# Patient Record
Sex: MALE | Race: WHITE
[De-identification: names, ages, dates, MRNs, and addresses within clinical notes are randomized per-mention and may not be internally consistent; named-entity substitution may affect disease eponyms.]

---

## 2019-07-12 NOTE — DIAGNOSTIC IMAGING REPORT
EXAMINATION:  CHEST 2 VIEWS    



INDICATION: Pre-operative. 



COMPARISON:  None

     

FINDINGS:

TUBES and LINES:  None.



LUNGS:  Lungs are well inflated.  Lungs are clear.   There is no evidence of

pneumonia or pulmonary edema.



PLEURA:  No pleural effusion or pneumothorax. 



HEART AND MEDIASTINUM:  The cardiomediastinal silhouette is unremarkable.    



BONES AND SOFT TISSUES:  No acute osseous lesion.  Soft tissues are

unremarkable.



UPPER ABDOMEN: No free air under the diaphragm.    



IMPRESSION: 

No acute radiographic abnormality.



Signed by: Dr. Lizbet Quintanilla MD on 7/12/2019 12:38 PM

## 2019-07-15 NOTE — PRE OP HISTORY & PHYSICAL
DATE OF SURGERY:  07/16/2019.

 

CHIEF COMPLAINT:  Left neck mass.

 

HISTORY OF PRESENT ILLNESS:  This 58 years old male was noted to have 1 year history of

left neck mass.  The patient has been treated with antibiotics with no improvement.  The

patient had a MRI of the C-spine, which also confirmed a neck mass.  He denies any

dysphagia, odynophagia, or shortness of breath.  The patient smokes a pack and a half a

day and drinks a few beers per day.  CT scan of the neck ordered by me showed the

patient has multiple variable sized nodules and lymph nodes in the left posterior

triangle, lymphoma and inflammatory disease and metastatic disease cannot be ruled out. 

 

REVIEW OF SYSTEMS:

System review showed no recent cardiovascular, respiratory, GI problem.

 

PAST MEDICAL HISTORY:  The patient has a history of hepatitis C.

 

PAST SURGICAL HISTORY:  He has no previous surgery.

 

ALLERGIES:  HE IS ALLERGIC TO PENICILLIN, AMPICILLIN, AND TETRACYCLINE.

 

MEDICATIONS:  He is on hydrocodone, Flexeril, and ibuprofen.

 

SOCIAL HISTORY:  He smokes about a pack and a half a day and drinks about three beers

per day. 

 

FAMILY HISTORY:  Noncontributory.

 

PHYSICAL EXAMINATION:

VITAL SIGNS:  On examination, the patient's vital signs were within normal limits.  He

was seen with his wife. 

HEENT:  Ear exam showed normal tympanic membranes bilaterally.  Nasal exam showed

deviated nasal septum on the right side about 20%.  Nasal endoscopy showed mobile vocal

folds bilaterally with no lesion in the hypopharynx.  Oropharynx and oral cavity showed

dentures upper and lower.  No other abnormality was noted. 

NECK:  Show left neck mass from the jugulodigastric area all the way to the posterior

triangle.  No thyroid is palpable. 

CHEST:  Showed good air entry bilaterally. 

CARDIOVASCULAR:  Showed S1, S2.  No murmur noted.

ASSESSMENT AND PLAN:  Mr. Isbell has left neck masses.  This has been going on for about

a year.  The suggested treatment is panendoscopy, biopsy, and excisional biopsy of left

neck mass, possibly in the jugulodigastric area but also possibly in the posterior

triangle and other necessary procedure.  Complication of procedure includes, but not

limited to bleeding, infection, facial nerve injury, accessory nerve injury, perforation

of the esophagus, pneumomediastinum, mediastinitis, airway compromise, persistent

recurrence of problem along with great auricular nerve injury and hypoglossal nerve

injury along with poor cosmetic results.  Alternative will be continue observation,

fine-needle aspiration of the lymph node.  The patient and his wife have elected to

undergo surgical procedure.  He has been advised to stop his 

ibuprofen at least 5 to 7 days before surgery.  The patient had a chest x-ray before

surgery, which did not show any abnormality. 

 

 

 

 

______________________________

MD SENTHIL Ocampo/JENA

D:  07/15/2019 11:39:33

T:  07/15/2019 12:19:18

Job #:  [id number]

 

cc:            Rosa Elena Haley

## 2019-07-16 ENCOUNTER — HOSPITAL ENCOUNTER (OUTPATIENT)
Dept: HOSPITAL 88 - OR | Age: 59
Discharge: HOME | End: 2019-07-16
Attending: OTOLARYNGOLOGY
Payer: COMMERCIAL

## 2019-07-16 VITALS — DIASTOLIC BLOOD PRESSURE: 81 MMHG | SYSTOLIC BLOOD PRESSURE: 139 MMHG

## 2019-07-16 DIAGNOSIS — C96.9: Primary | ICD-10-CM

## 2019-07-16 DIAGNOSIS — Z86.19: ICD-10-CM

## 2019-07-16 DIAGNOSIS — Z88.1: ICD-10-CM

## 2019-07-16 DIAGNOSIS — Z88.0: ICD-10-CM

## 2019-07-16 DIAGNOSIS — D10.4: ICD-10-CM

## 2019-07-16 DIAGNOSIS — Z01.810: ICD-10-CM

## 2019-07-16 DIAGNOSIS — Z01.818: ICD-10-CM

## 2019-07-16 LAB
DEPRECATED APTT PLAS QN: 32.9 SECONDS (ref 23.8–35.5)
DEPRECATED INR PLAS: 0.97
PROTHROMBIN TIME: 13.4 SECONDS (ref 11.9–14.5)

## 2019-07-16 PROCEDURE — 38510 BIOPSY/REMOVAL LYMPH NODES: CPT

## 2019-07-16 PROCEDURE — 93005 ELECTROCARDIOGRAM TRACING: CPT

## 2019-07-16 PROCEDURE — 36415 COLL VENOUS BLD VENIPUNCTURE: CPT

## 2019-07-16 PROCEDURE — 31535 LARYNGOSCOPY W/BIOPSY: CPT

## 2019-07-16 PROCEDURE — 85610 PROTHROMBIN TIME: CPT

## 2019-07-16 PROCEDURE — 31622 DX BRONCHOSCOPE/WASH: CPT

## 2019-07-16 PROCEDURE — 88342 IMHCHEM/IMCYTCHM 1ST ANTB: CPT

## 2019-07-16 PROCEDURE — 88304 TISSUE EXAM BY PATHOLOGIST: CPT

## 2019-07-16 PROCEDURE — 43191 ESOPHAGOSCOPY RIGID TRNSO DX: CPT

## 2019-07-16 PROCEDURE — 88305 TISSUE EXAM BY PATHOLOGIST: CPT

## 2019-07-16 PROCEDURE — 71046 X-RAY EXAM CHEST 2 VIEWS: CPT

## 2019-07-16 PROCEDURE — 85730 THROMBOPLASTIN TIME PARTIAL: CPT

## 2019-07-16 NOTE — OPERATIVE REPORT
DATE OF PROCEDURE:  07/16/2019

 

SURGEON:  Abdirahman Asher MD

 

CHIEF COMPLAINT:  Left posterior triangle mass.

 

POSTOPERATIVE DIAGNOSIS:  Left posterior triangle mass.

 

OPERATIVE PROCEDURES:  Direct laryngoscopy, rigid esophagoscopy, rigid bronchoscopy,

biopsy of left tongue base, biopsy of the right tonsil area midportion, excision of

biopsy of left neck mass with appropriate closure. 

 

FIRST ASSISTANT:  Tania Zhu.

 

ANESTHESIA:  [facility].

 

HISTORY OF PRESENT ILLNESS:  This is a 58-year-old male, who has one-year history of

mass in the left neck.  The patient denies any dysphagia, odynophagia, or shortness of

breath.  The patient is a smoker.  On examination, he was noted to have fullness in the

left neck in the midportion to the inferior portion of the posterior triangle.  A CT

scan of the neck that was done showed the patient has cluster of lymphadenopathy from

the level of the parotid and along the posterior triangle on the way to the inferior

portion of the neck.  No other abnormality was noted.  It was decided that excisional

biopsy of the left neck mass along with panendoscopy and biopsy and other necessary

procedure will be beneficial for him. 

 

DESCRIPTION OF PROCEDURE:  The patient was taken to the operating room, put under

general anesthesia, endotracheally intubated.  The neck excision was undertaken first.

An incision was made in about the midportion of the neck in a horizontal fashion.  The

area was injected with 1% Xylocaine with 1:100,000 epinephrine for hemostasis.  The area

was prepped and draped in sterile fashion.  The dissection was carried down to the

subplatysmal plane.  Superior and inferior flap was elevated.  The anterior border of

the SCM was identified and this was  from surrounding soft tissue.  The

approach was to go by rotating the SCM laterally to get exposure to the posterior

triangle.  The internal jugular vein come into view, this was not disturbed.  A branch

of the IJ was noted.  This was examined high after getting exposure.  With some

difficulty, the SCM was rotated and posterior to the IJ lymphadenopathy come into view.

These were dissected from the surrounding soft tissue using bipolar cautery.  The lymph

node was delivered and sent for permanent section. 

 

Closure of the area was undertaken.  The area was irrigated with copious amount of

normal saline.  Any bleeding area was controlled using the bipolar cautery.  A one

8-inch Penrose drain was inserted into the underneath the SCM.  The wound was irrigated

with copious amount of normal saline.  The platysmal flap that was elevated was advanced

in the midline and closed on itself using 0 Vicryl suture in interrupted fashion.  The

skin incision was closed using 4-0 Prolene suture in an interrupted fashion.  The

Penrose drain was sutured in place.  The pressure dressing was applied. 

 

The panendoscopy was performed.  The patient was repositioned.  The rigid esophagoscopy

was performed.  Esophagoscope was passed through the cricopharyngeus muscle.  The

esophagus was examined to about 25 cm from the incisors, no abnormality was noted.  The

esophagoscope was retrieved. 

 

The rigid bronchoscopy was performed.  A size #4 bronchoscope with Soto wire was

used.  The bronchoscope was passed parallel to the endotracheal tube.  Endotracheal tube

cuff was deflated.  The trachea was examined down to the tessa, parallel to the

endotracheal tube.  No abnormality was noted.  The bronchoscope was retrieved.

Endotracheal tube cuff was reinflated. 

 

The direct laryngoscopy was performed.  The Anali laryngoscope was used.  The

oropharynx and oral cavity were examined.  Increased lymphoid tissue was noted in the

left tongue base.  This was biopsied using a cup forceps.  The papillary lesion was

noted in the midportion of the right tonsil.  This was biopsied again using a cup

forceps.  The piriform sinus on either side was examined, no abnormality was noted.  The

larynx was examined.  Both the true and false vocal folds were examined, no abnormality

was noted.  The patient tolerated the above procedure well with estimated blood loss of

about 5 to 10 mL.  He was given 20 mg of Decadron intraoperatively.  The patient was

able to be transferred to the recovery room in stable condition. 

 

 

 

 

______________________________

MD SENTHIL Ocampo/MODL

D:  07/16/2019 13:43:42

T:  07/16/2019 14:18:09

Job #:  [id number]

## 2019-07-16 NOTE — XMS REPORT
Patient Summary Document

                             Created on: 2019



FELICIANO BOWEN

External Reference #: [id number]

: 1960

Sex: Male



Demographics







                          Address                   [address] 

[place]  [address]

 

                          Home Phone                [phone]

 

                          Preferred Language        Unknown

 

                          Marital Status            Unknown

 

                          Yazidi Affiliation     Unknown

 

                          Race                      Unknown

 

                                        Additional Race(s)  

 

                          Ethnic Group              Unknown





Author







                          Author                    [facility]

 

                          Address                   Unknown

 

                          Phone                     Unavailable







Care Team Providers







                    Care Team Member Name    Role                Phone

 

                    VIV TA    Unavailable         Unavailable







Problems

This patient has no known problems.



Allergies, Adverse Reactions, Alerts

This patient has no known allergies or adverse reactions.



Medications

This patient has no known medications.



Results







           Test Description    Test Time    Test Comments    Text Results    Atomic Results    Result

 Comments

 

                CHEST 2 VIEWS    2019 12:37:00                                                             

                                             [place] [address]  
   Patient Name: FELICIANO BOWEN JR                                   MR #: 
[id number]                     : 1960                                  
Age/Sex: 58/M  Acct #: [id number]                              Req #: [id number]
[id number]  Adm Physician:                                                      
Ordered by: PUJA TA MD                            Report #: [id number]  
     Location: OR                                      Room/Bed:                
    
___________________________________________________________________________________________________
   Procedure: [id number] DX/CHEST 2 VIEWS  Exam Date:                            
Exam Time:                                               REPORT STATUS: Signed  
 EXAMINATION:  CHEST 2 VIEWS          INDICATION: Pre-operative.       
COMPARISON:  None           FINDINGS:   TUBES and LINES:  None.      LUNGS:  
Lungs are well inflated.  Lungs are clear.   There is no evidence of   pneumonia
or pulmonary edema.      PLEURA:  No pleural effusion or pneumothorax.       
HEART AND MEDIASTINUM:  The cardiomediastinal silhouette is unremarkable.       
  BONES AND SOFT TISSUES:  No acute osseous lesion.  Soft tissues are   
unremarkable.      UPPER ABDOMEN: No free air under the diaphragm.          
IMPRESSION:    No acute radiographic abnormality.      Signed by: Dr. Benny Corado MD on 2019 12:38 PM        Dictated By: BENNY CORADO MD  Electronically 
Signed By: BENNY CORADO MD on 19 1238  Transcribed By: LUCILLE on 19 
1238       COPY TO:   PUJA TA MD

## 2019-07-16 NOTE — XMS REPORT
Clinical Summary

                             Created on: 2019



Kedar Isbell

External Reference #: [id number]

: 1960

Sex: Male



Demographics







                          Address                   [place]  [address]

 

                          Home Phone                [phone]

 

                          Preferred Language        English

 

                          Marital Status            

 

                          Pentecostalism Affiliation     Unknown

 

                          Race                      White

 

                          Ethnic Group              Non-





Author







                          Author                    [facility]

 

                          Organization              [facility]

 

                          Address                   Unknown

 

                          Phone                     Unavailable







Support







                Name            Relationship    Address         Phone

 

                Starla Isbell            Unknown         [phone]







Care Team Providers







                    Care Team Member Name    Role                Phone

 

                    Asked, No Pcp       PCP                 Unavailable







Allergies







                                        Comments



                 Active Allergy     Reactions       Severity        Noted Date 

 

                                         



                           Ampicillin                2018 

 

                                         



                           Penicillin                2018 

 

                                         



                           Tetracycline              2018 







Medications







                          End Date                  Status



              Medication     Sig          Dispensed     Refills      Start  



                                         Date  

 

                                                    Active



                 cyclobenzaprine     Take 5 mg by      1                 



                     (FLEXERIL) 5 mg tablet     mouth 2 (two)       8  



                                         times a day.     

 

                                                    Active



                     HYDROcodone-acetaminophen       0                     



                           (NORCO)  mg per        8  



                                         tablet      

 

                                                    Active



                     gabapentin (NEURONTIN)     Take 100 mg         0   



                           100 mg capsule            by mouth 2     



                                         (two) times a     



                                         day.     

 

                                                    Active



                     sofosbuvir-velpatasvir     Take 1 tablet       0   



                           (EPCLUSA) 400-100 mg      by mouth     



                           tablet                    daily.     

 

                                                    Active



                     ribavirin (REBETOL) 200     Take 200 mg         0   



                           MG capsule                by mouth     



                                         daily before     



                                         breakfast.     

 

                                                    Active



                     ribavirin (REBETOL) 200     Take 400 mg         0   



                           MG capsule                by mouth     



                                         every     



                                         evening.     

 

                                                    Active



              diclofenac (VOLTAREN) 1 %     Apply        100 g        1              



                     gel                 topically 4         8  



                                         (four) times     



                                         a day.     







Active Problems







 



                           Problem                   Noted Date

 

 



                           Neck pain, musculoskeletal     2018

 

 



                           Hepatic fibrosis          2018

 

 



                           Abnormal LFTs             2018

 

 



                           Rash of hands             2018

 

 



                           Hepatitis C virus infection without hepatic coma     2018

 

 



                           Itching                   2018







Encounters







                          Care Team                 Description



                     Date                Type                Specialty  

 

                                        



Ester Ontiveros MA                        



                     2019          Telephone           Hepatology  

 

                                        



Ester Ontiveros MA                       Hepatitis C virus infection without hepatic coma, unspecified 

chronicity (Primary Dx)



                     2019          Orders Only         Hepatology  

 

                                        



Bernabe Harris MD Roitsch, Ashley Aryn, [facility]              Chronic hepatitis C without hepatic coma (HCC) (Primary

 Dx); 

Hepatic fibrosis; 

Abnormal LFTs



                     10/25/2018          Office Visit        Hepatology  

 

                                        



Bernabe Harris MD                     



                     10/18/2018          Telephone           Hepatology  

 

                                        



Venkatesh Appiah MD                    Acute hepatitis C virus infection without hepatic coma; 

Hepatic fibrosis; 

Abnormal LFTs; 

Rash of hands



                     10/17/2018          Hospital            Radiology  



                                         Encounter   

 

                                        



Venkatesh Appiah MD                    Acute hepatitis C virus infection without hepatic coma; 

Hepatic fibrosis; 

Abnormal LFTs; 

Rash of hands



                     10/17/2018          Hospital            Radiology  



                                         Encounter   

 

                                        



Ester Ontiveros MA                       Chronic hepatitis C without hepatic coma (HCC) (Primary Dx)



                     10/05/2018          Orders Only         Hepatology  

 

                                        



Jessica Sheldon MA                        



                     10/02/2018          Refill              Hepatology  

 

                                        



Bernabe Harris MD Calaris, Deborah, PAC                   Acute hepatitis C virus infection without hepatic coma (Primary

 Dx); 

Hepatic fibrosis; 

Abnormal LFTs; 

Rash of hands



                     2018          Office Visit        Hepatology  

 

                                        



Bernabe Harris MD Calaris, Deborah, PAC                   Hepatitis C virus infection without hepatic coma, unspecified

 chronicity (Primary Dx); 

Rash of hands; 

Itching; 

Abnormal LFTs; 

Neck pain, musculoskeletal; 

Hepatic fibrosis



                     2018          Office Visit        Hepatology  

 

                                        



Ester Ontiveros MA                       Hepatitis C virus infection without hepatic coma, unspecified 

chronicity (Primary Dx)



                     2018          Orders Only         Hepatology  



after 07/15/2018



Family History







   



                 Medical History     Relation        Name            Comments

 

   



                           Heart attack              Father  

 

   



                           Diabetes                  Mother  

 

   



                           Heart attack              Mother  

 

   



                           Diabetes                  Sister  

 

   



                           Heart attack              Sister  









   



                 Relation        Name            Status          Comments

 

   



                                         Father   

 

   



                                         Mother   

 

   



                                         Sister   







Social History







                                        Date



                 Tobacco Use     Types           Packs/Day       Years Used 

 

                                         



                           Current Every Day Smoker      2  

 

    



                                         Smokeless Tobacco: Never   



                                         Used   









   



                 Alcohol Use     Drinks/Week     oz/Week         Comments

 

   



                     Yes                 14 Standard         8.4 



                                         drinks or  



                                         equivalent  









 



                           Sex Assigned at Birth     Date Recorded

 

 



                                         Not on file 









                                        Industry



                           Job Start Date            Occupation 

 

                                        Not on file



                           Not on file               Not on file 









                                        Travel End



                           Travel History            Travel Start 

 





                                         No recent travel history available.







Last Filed Vital Signs







                                        Time Taken



                           Vital Sign                Reading 

 

                                        10/25/2018 11:19 AM CDT



                           Blood Pressure            136/80 

 

                                        10/25/2018 11:19 AM CDT



                           Pulse                     80 

 

                                        10/25/2018 11:19 AM CDT



                           Temperature               36.1 C (97 F) 

 

                                        2018  2:18 PM CDT



                           Respiratory Rate          18 

 

                                        10/25/2018 11:19 AM CDT



                           Oxygen Saturation         99% 

 

                                        -



                           Inhaled Oxygen            - 



                                         Concentration  

 

                                        10/25/2018 11:19 AM CDT



                           Weight                    81.2 kg (179 lb) 

 

                                        2018 10:56 AM CDT



                           Height                    175.3 cm (5' 9") 

 

                                        2018 10:56 AM CDT



                           Body Mass Index           26.43 







Plan of Treatment







   



                 Health Maintenance     Due Date        Last Done       Comments

 

   



                           COLONOSCOPY SCREENING     2011  

 

   



                           SHINGLES VACCINES (#1)     2011  

 

   



                           INFLUENZA VACCINE         2019  







Procedures







                                        Comments



                 Procedure Name     Priority        Date/Time       Associated Diagnosis 

 

                                        



Results for this procedure are in the results section.



                 PROTHROMBIN TIME WITH INR     Routine         01/15/2019      Chronic hepatitis C 



                           2:13 PM CST               without hepatic coma 



                                         (HCC) 

 

                                        



Results for this procedure are in the results section.



                 HCV QUANTITATIVE PCR     Routine         01/15/2019      Chronic hepatitis C 



                           2:13 PM CST               without hepatic coma 



                                         (HCC) 

 

                                        



Results for this procedure are in the results section.



                 GGT             Routine         01/15/2019      Chronic hepatitis C 



                           2:13 PM CST               without hepatic coma 



                                         (HCC) 

 

                                        



Results for this procedure are in the results section.



                 COMPREHENSIVE METABOLIC     Routine         01/15/2019      Chronic hepatitis C 



                     PANEL               2:13 PM CST         without hepatic coma 



                                         (HCC) 

 

                                        



Results for this procedure are in the results section.



                 CBC WITH PLATELET AND     Routine         01/15/2019      Chronic hepatitis C 



                     DIFFERENTIAL        2:13 PM CST         without hepatic coma 



                                         (HCC) 

 

                                        



Results for this procedure are in the results section.



                 ALPHA FETOPROTEIN     Routine         01/15/2019      Chronic hepatitis C 



                           2:13 PM CST               without hepatic coma 



                                         (HCC) 

 

                                        



Results for this procedure are in the results section.



                 PROTHROMBIN TIME WITH INR     Routine         10/17/2018      Chronic hepatitis C 



                           12:35 PM CDT              without hepatic coma 



                                         (HCC) 

 

                                        



Results for this procedure are in the results section.



                 HCV QUANTITATIVE PCR     Routine         10/17/2018      Chronic hepatitis C 



                           12:35 PM CDT              without hepatic coma 



                                         (HCC) 

 

                                        



Results for this procedure are in the results section.



                 ALPHA FETOPROTEIN     Routine         10/17/2018      Chronic hepatitis C 



                           12:35 PM CDT              without hepatic coma 



                                         (HCC) 

 

                                        



Results for this procedure are in the results section.



                 COMPREHENSIVE METABOLIC     Routine         10/17/2018      Chronic hepatitis C 



                     PANEL               12:35 PM CDT        without hepatic coma 



                                         (HCC) 

 

                                        



Results for this procedure are in the results section.



                 CBC WITH PLATELET AND     Routine         10/17/2018      Chronic hepatitis C 



                     DIFFERENTIAL        12:35 PM CDT        without hepatic coma 



                                         (HCC) 

 

                                        



Results for this procedure are in the results section.



                 US ABDOMINAL DOPPLER     Routine         10/17/2018      Acute hepatitis C virus 



                           11:24 AM CDT              infection without hepatic 



                                         coma 



                                         Hepatic fibrosis 



                                         Abnormal LFTs 



                                         Rash of hands 

 

                                        



Results for this procedure are in the results section.



                 US ABDOMINAL WITH LIVER     Routine         10/17/2018      Acute hepatitis C virus 



                     ELASTOGRAPHY        10:29 AM CDT        infection without hepatic 



                                         coma 



                                         Hepatic fibrosis 



                                         Abnormal LFTs 



                                         Rash of hands 

 

                                        



Results for this procedure are in the results section.



                 HEPATITIS C VIRAL RNA,     Routine         2018      Acute hepatitis C virus 



                     QUANTITATIVE REAL-TIME      11:37 AM CDT        infection without hepatic 



                           PCR WITH REFLXS           coma 



                                         Hepatic fibrosis 



                                         Abnormal LFTs 



                                         Rash of hands 

 

                                        



Results for this procedure are in the results section.



                 PROTHROMBIN TIME WITH INR     Routine         2018      Acute hepatitis C virus 



                           11:37 AM CDT              infection without hepatic 



                                         coma 



                                         Hepatic fibrosis 



                                         Abnormal LFTs 



                                         Rash of hands 

 

                                        



Results for this procedure are in the results section.



                 COMPREHENSIVE METABOLIC     Routine         2018      Acute hepatitis C virus 



                     PANEL               11:37 AM CDT        infection without hepatic 



                                         coma 



                                         Hepatic fibrosis 



                                         Abnormal LFTs 



                                         Rash of hands 

 

                                        



Results for this procedure are in the results section.



                 CBC WITH PLATELET AND     Routine         2018      Acute hepatitis C virus 



                     DIFFERENTIAL        11:37 AM CDT        infection without hepatic 



                                         coma 



                                         Hepatic fibrosis 



                                         Abnormal LFTs 



                                         Rash of hands 

 

                                        



Results for this procedure are in the results section.



                 PROTHROMBIN TIME WITH INR     Routine         2018      Hepatitis C virus 



                           11:52 AM CDT              infection without hepatic 



                                         coma, unspecified 



                                         chronicity 



                                         Rash of hands 



                                         Itching 



                                         Abnormal LFTs 

 

                                        



Results for this procedure are in the results section.



                 HCV QUANTITATIVE PCR     Routine         2018      Hepatitis C virus 



                           11:52 AM CDT              infection without hepatic 



                                         coma, unspecified 



                                         chronicity 



                                         Rash of hands 



                                         Itching 



                                         Abnormal LFTs 

 

                                        



Results for this procedure are in the results section.



                 GGT             Routine         2018      Hepatitis C virus 



                           11:52 AM CDT              infection without hepatic 



                                         coma, unspecified 



                                         chronicity 



                                         Rash of hands 



                                         Itching 



                                         Abnormal LFTs 

 

                                        



Results for this procedure are in the results section.



                 COMPREHENSIVE METABOLIC     Routine         2018      Hepatitis C virus 



                     PANEL               11:52 AM CDT        infection without hepatic 



                                         coma, unspecified 



                                         chronicity 



                                         Rash of hands 



                                         Itching 



                                         Abnormal LFTs 

 

                                        



Results for this procedure are in the results section.



                 CBC WITH PLATELET AND     Routine         2018      Hepatitis C virus 



                     DIFFERENTIAL        11:52 AM CDT        infection without hepatic 



                                         coma, unspecified 



                                         chronicity 



                                         Rash of hands 



                                         Itching 



                                         Abnormal LFTs 

 

                                        



Results for this procedure are in the results section.



                 PROTHROMBIN TIME WITH INR     Routine         2018      Hepatitis C virus 



                           3:18 PM CDT               infection without hepatic 



                                         coma, unspecified 



                                         chronicity 



                                         Abnormal LFTs 

 

                                        



Results for this procedure are in the results section.



                 HCV QUANTITATIVE PCR     Routine         2018      Hepatitis C virus 



                           3:18 PM CDT               infection without hepatic 



                                         coma, unspecified 



                                         chronicity 



                                         Abnormal LFTs 

 

                                        



Results for this procedure are in the results section.



                 GGT             Routine         2018      Hepatitis C virus 



                           3:18 PM CDT               infection without hepatic 



                                         coma, unspecified 



                                         chronicity 



                                         Abnormal LFTs 

 

                                        



Results for this procedure are in the results section.



                 COMPREHENSIVE METABOLIC     Routine         2018      Hepatitis C virus 



                     PANEL               3:18 PM CDT         infection without hepatic 



                                         coma, unspecified 



                                         chronicity 



                                         Abnormal LFTs 

 

                                        



Results for this procedure are in the results section.



                 CBC WITH PLATELET AND     Routine         2018      Hepatitis C virus 



                     DIFFERENTIAL        3:18 PM CDT         infection without hepatic 



                                         coma, unspecified 



                                         chronicity 



                                         Abnormal LFTs 

 

                                        



Results for this procedure are in the results section.



                 HCV QUANTITATIVE PCR     Routine         2018      Hepatitis C virus 



                           2:11 PM CDT               infection without hepatic 



                                         coma, unspecified 



                                         chronicity 

 

                                        



Results for this procedure are in the results section.



                 PROTHROMBIN TIME WITH INR     Routine         2018      Hepatitis C virus 



                           2:11 PM CDT               infection without hepatic 



                                         coma, unspecified 



                                         chronicity 

 

                                        



Results for this procedure are in the results section.



                 GGT             Routine         2018      Hepatitis C virus 



                           2:11 PM CDT               infection without hepatic 



                                         coma, unspecified 



                                         chronicity 

 

                                        



Results for this procedure are in the results section.



                 COMPREHENSIVE METABOLIC     Routine         2018      Hepatitis C virus 



                     PANEL               2:11 PM CDT         infection without hepatic 



                                         coma, unspecified 



                                         chronicity 

 

                                        



Results for this procedure are in the results section.



                 CBC WITH PLATELET AND     Routine         2018      Hepatitis C virus 



                     DIFFERENTIAL        2:11 PM CDT         infection without hepatic 



                                         coma, unspecified 



                                         chronicity 

 

                                        



Results for this procedure are in the results section.



                 ALPHA FETOPROTEIN     Routine         2018      Hepatitis C virus 



                           2:11 PM CDT               infection without hepatic 



                                         coma, unspecified 



                                         chronicity 



after 07/15/2018



Results

* HCV Quantitative PCR (01/15/2019  2:13 PM CST)



Only the most recent of 5 results within the time period is included.





    



              Component     Value        Ref Range     Performed At     Pathologist



                                         Signature

 

    



                 Hepatitis C     <15 NOT DETECTED     NOT DETECTED IU/mL     [facility] 



                           quantitative,             [facility] 



                           PCR                       [facility] 

 

    



                 Hepatitis C     <1.18 NOT DETECTED     NOT DETECTED Log     [facility] 



                     quantitative,       IU/mL               [facility] 



                           PCR                       ING II 

 

    



                     (Always             Comment:            [facility] 



                     message)            This test was performed using      [facility] 



                           Real-Time Polymerase Chain      ING II 



                                         Reaction.   



                                         Reportable Range: 15 IU/mL to   



                                         100,000,000 IU/mL   



                                         (1.18 Log IU/mL to 8.00 Log   



                                         IU/mL).   



                                         The analytical performance   



                                         characteristics of this   



                                         assay have been determined by   



                                         [facility].   



                                         The modifications have not   



                                         been cleared or approved by   



                                         the FDA. This assay has been   



                                         validated pursuant to the   



                                         CLIA regulations and is used   



                                         for clinical purposes.   



                                         For more information on this   



                                         test, go to:   



                                         http://ProBueno.N-Trig/faq/[email or web address]   



                                         (This link is being provided   



                                         for informational/   



                                         educational purposes only.)   













                                         Specimen

 





                                         Blood









                                        Resulting Agency Comment

 

                                        



Performing Organization Information:



Site ID: [id number]



[facility]: [facility]



Address: [address] [place] [id number]



Director: Dr. Bernabe Tamayo









   



                 Performing Organization     Address         City/[facility]     Phone Number

 

   



                                         [facility]   

 

   



                 [place] [id number] [phone]



                                         II   





* Alpha fetoprotein (01/15/2019  2:13 PM CST)



Only the most recent of 3 results within the time period is included.





    



              Component     Value        Ref Range     Performed At     Pathologist



                                         [facility] [place]

 

    



                 Alpha           2.3             <6.1 ng/mL      [facility] 



                     fetoprotein         Comment:            [facility] 



                           This test was performed using      ING II 



                                         the Kareem Grayling   



                                         chemiluminescent method.   



                                         Values obtained from   



                                         different assay methods cannot   



                                         be used   



                                         interchangeably. AFP levels,   



                                         regardless of   



                                         value, should not be   



                                         interpreted as absolute   



                                         evidence of the presence or   



                                         absence of disease.   













                                         Specimen

 





                                         Blood









                                        Resulting Agency Comment

 

                                        



Performing Organization Information:



Site ID: [id number]



Name: [facility]



Address: [address] [place] [id number]



Director: Dr. Bernabe Tamayo









   



                 Performing Organization     Address         City/[facility] Centercode     Phone Number

 

   



                                         [facility]   

 

   



                 [facility] [address] [phone]



                                         II   





* Prothrombin time with INR (01/15/2019  2:13 PM CST)



Only the most recent of 6 results within the time period is included.





    



              Component     Value        Ref Range     Performed At     Pathologist



                                         Signature

 

    



                     INR                 1.1                 [facility] 



                           Comment:                  [facility] 



                           Reference                 ING II 



                                         Range   



                                          0.9-1.1   



                                         Moderate-intensity Warfarin   



                                         Therapy 2.0-3.0   



                                         Higher-intensity Warfarin   



                                         Therapy 3.0-4.0   

 

    



                 Prothrombin     11.4            9.0 - 11.5 sec     [facility] 



                     time                Comment:            [facility] 



                           For more information on this      ING II 



                                         test, go to:   



                                         http://education.N-Trig/faq/AFN649   













                                         Specimen

 





                                         Blood









                                        Resulting Agency Comment

 

                                        



Performing Organization Information:



Site ID: [id number]



Name: [facility]



Address: [address] [place] [address]



Director: Dr. Bernabe Tamayo









   



                 Performing Organization     Address         City/State/Zipcode     Phone Number

 

   



                                         [facility]     [address] [address] [phone]



                                         II   





* CBC with platelet and differential (01/15/2019  2:13 PM CST)



Only the most recent of 6 results within the time period is included.





    



              Component     Value        Ref Range     Performed At     Pathologist



                                         Signature

 

    



                 WBC             7.4             3.8 - 10.8      [facility] 



                           Thousand/uL               [facility] 



                                         [facility] II 

 

    



                 RBC             5.79            4.20 - 5.80     [facility] 



                           Million/uL                [facility] 



                                         [facility] 

 

    



                 HGB             17.0            13.2 - 17.1 g/dL     [facility] 



                                         [facility] 



                                         ING II 

 

    



                 HCT             48.5            38.5 - 50.0 %     [facility] 



                                         [facility] 



                                         ING II 

 

    



                 MCV             83.8            80.0 - 100.0 fL     [facility] 



                                         [facility] 



                                         [facility] II 

 

    



                 MCH             29.4            27.0 - 33.0 pg     [facility] 



                                         [facility] 



                                         ING II 

 

    



                 MCHC            35.1            32.0 - 36.0 g/dL     [facility] 



                                         [facility] 



                                         ING II 

 

    



                 RDW             15.4 (H)        11.0 - 15.0 %     [facility] 



                                         [facility] 



                                         ING II 

 

    



                 Platelet count     231             140 - 400       [facility] 



                           Thousand/uL               [facility] 



                                         ING II 

 

    



                 MPV             11.6            7.5 - 12.5 fL     [facility] 



                                         [facility] 



                                         ING II 

 

    



                 Neutrophils,     3,004           1,500 - 7,800     [facility] 



                     absolute            cells/uL            [facility] 



                                         ING II 

 

    



                 Lymphocytes,     3,352           850 - 3,900 cells/uL     [facility] 



                           absolute                  [facility] 



                                         ING II 

 

    



                 Monocytes,      829             200 - 950 cells/uL     [facility] 



                           absolute                  [facility] 



                                         ING II 

 

    



                 Eosinophils,     148             15 - 500 cells/uL     [facility] 



                           absolute                  [facility] 



                                         ING II 

 

    



                 Basophils,      67              0 - 200 cells/uL     [facility] 



                           absolute                  [facility] 



                                         ING II 

 

    



                 Neutrophils     40.6            %               [facility] 



                                         [facility] 



                                         ING II 

 

    



                 Lymphocytes     45.3            %               [facility] 



                                         [facility] 



                                         ING II 

 

    



                 Monocytes       11.2            %               [facility] 



                                         [facility] 



                                         ING II 

 

    



                 Eosinophils     2.0             %               [facility] 



                                         [facility] 



                                         ING II 

 

    



                 Basophils + RC     0.9             %               [facility] 



                                         [facility] 



                                         ING II 













                                         Specimen

 





                                         Blood









                                        Resulting Agency Comment

 

                                        



Performing Organization Information:



Site ID: [id number]



Name: [facility]



Address: [address] [place] [id number]



Director: Dr. Bernabe Tamayo









   



                 Performing Organization     Address         City/[facility]/Zipcode     Phone Number

 

   



                                         [facility]   

 

   



                 [place] [id number] [place] [address] [phone]



                                         II   





* GGT (01/15/2019  2:13 PM CST)



Only the most recent of 4 results within the time period is included.





    



              Component     Value        Ref Range     Performed At     Pathologist



                                         Signature

 

    



                 GGT             25              3 - 85 U/L      [facility] 



                                         [facility] 



                                         ING II 













                                         Specimen

 





                                         Blood









                                        Resulting Agency Comment

 

                                        



Performing Organization Information:



Site ID: [id number]



Name: [facility]



Address: [address] [place] [address]



Director: Dr. Bernabe Tamayo









   



                 Performing Organization     Address         City/[facility] Cent[facility]ode     Phone Number

 

   



                                         [facility] [address] [phone]



                                         II   





* Comprehensive metabolic panel (01/15/2019  2:13 PM CST)



Only the most recent of 6 results within the time period is included.





    



              Component     Value        Ref Range     Performed At     Pathologist



                                         [facility] [place]

 

    



                 Glucose         128 (H)         65 - 99 mg/dL     [facility] 



                           Comment:                  [facility] 



                            Fasting      ING II 



                                         reference interval   



                                         For someone without known   



                                         diabetes, a glucose   



                                         value >125 mg/dL indicates   



                                         that they may have   



                                         diabetes and this should be   



                                         confirmed with a   



                                         follow-up test.   

 

    



                 BUN, whole      25              7 - 25 mg/dL     [facility] 



                           blood                     [facility] 



                                         ING II 

 

    



                 Creatinine      0.94            0.70 - 1.33 mg/dL     [facility] 



                           Comment:                  [facility] 



                           For patients >49 years of age,      ING II 



                                         the reference limit   



                                         for Creatinine is   



                                         approximately 13% higher for   



                                         people   



                                         identified as   



                                         -American.   

 

    



                 EGFR Non-Afr.     90              > OR=60         [facility] 



                     American            mL/min/1.73m2       [facility] 



                                         ING II 

 

    



                 EGFR      104             > OR=60         [facility] 



                     American            mL/min/1.73m2       [facility] 



                                         ING II 

 

    



                 BUN/creatinine     NOT APPLICABLE     6 - 22 (calc)     [facility] 



                           ratio                     [facility] 



                                         ING II 

 

    



                 Sodium          138             135 - 146 mmol/L     [facility] 



                                         [facility] 



                                         ING II 

 

    



                 Potassium       4.3             3.5 - 5.3 mmol/L     [facility] 



                                         [facility] 



                                         ING II 

 

    



                 Chloride        103             98 - 110 mmol/L     [facility] 



                                         [facility] 



                                         ING II 

 

    



                 CO2             28              20 - 32 mmol/L     [facility] 



                                         [facility] 



                                         ING II 

 

    



                 Calcium         9.7             8.6 - 10.3 mg/dL     [facility] 



                                         [facility] 



                                         ING II 

 

    



                 Protein         7.9             6.1 - 8.1 g/dL     [facility] 



                                         [facility] 



                                         ING II 

 

    



                 Albumin, S      4.5             3.6 - 5.1 g/dL     [facility] 



                                         [facility] 



                                         ING II 

 

    



                 Globulin, total     3.4             1.9 - 3.7 g/dL     [facility] 



                           (calc)                    [facility] 



                                         ING II 

 

    



                 Albumin/globuli     1.3             1.0 - 2.5 (calc)     [facility] 



                           n ratio                   [facility] 



                                         ING II 

 

    



                 Total bilirubin     0.5             0.2 - 1.2 mg/dL     [facility] 



                                         [facility] 



                                         ING II 

 

    



                 Alkaline        58              40 - 115 U/L     [facility] 



                           phosphatase               [facility] 



                                         ING II 

 

    



                 AST             24              10 - 35 U/L     [facility] 



                                         [facility] 



                                         ING II 

 

    



                 ALT             17              9 - 46 U/L      [facility] 



                                         [facility] 



                                         ING II 













                                         Specimen

 





                                         Blood









                                        Resulting Agency Comment

 

                                        



Performing Organization Information:



Site ID: [id number]



[facility]



Address: [address] [place] [address] [place] [address]



Director: Dr. Bernabe Tamayo









   



                 Performing Organization     Address         City/State/Zipcode     Phone Number

 

   



                                         [facility]     [address] [facility]     [place] [address] [phone]



                                         II   





* US Abdominal Doppler (10/17/2018 11:24 AM CDT)









                                         Specimen

 











 



                           Narrative                 Performed At

 

 



                           EXAMINATION:US ABDOMINAL DOPPLER     [facility]



                                         CLINICAL HISTORY:B17.10 Acute hepatitis C without hepatic coma, K74.0 



                                         Hepatic fibrosis, Portal HTN, US liver W elastography & abdomen doppler...liver

 



                                         protocol 



                                         COMPARISON:None 



                                         TECHNIQUE: Gray scale, color Doppler and spectral waveform analysis of the 



                                         hepatic vasculature. 



                                         IMPRESSION: 



                                         1. PORTAL VEINS: 



                                         *Main portal vein: The main portal vein is patent. Portal vein velocity is 





                                         32 cm/sec.Normal flow direction. 



                                         *Left Portal Vein: The left portal vein is patent. 



                                         *Right Portal Vein:The right portal vein is patent. 



                                         2. HEPATIC VEINS: 



                                         *Right Hepatic Vein: The right hepatic vein is patent. 



                                         *Middle Hepatic Vein: The middle hepatic vein is patent. 



                                         *Left Hepatic Vein: The left hepatic vein is patent. 



                                         3. HEPATIC ARTERIES: 



                                         *Right Hepatic Artery: The right hepatic artery is patent. 



                                         *Left Hepatic Artery: The left hepatic artery is patent. 



                                         4. IVC: The inferior vena cava is patent. 



                                         5. SMV: The superior mesenteric vein is patent. 



                                         6. SPLENIC ARTERY/VEINS: 



                                         *Splenic Artery/Vein at Spleen: The splenic artery/vein atthe spleen are

 



                                         patent. Venous velocity 45 cm/s. 



                                         *Splenic Artery/Vein at Midline: The splenic artery/vein at the midline are

 



                                         patent. Venous velocity 26 cm/s. 









                                        Procedure Note

 

                                        



 Interface, Radiology Results Incoming - 10/17/2018  2:03 PM CDT



EXAMINATION:  US ABDOMINAL DOPPLER



CLINICAL HISTORY:  B17.10 Acute hepatitis C without hepatic coma, K74.0 Hepatic 
fibrosis, Portal HTN, US liver W elastography & abdomen doppler...liver protocol



COMPARISON:  None



TECHNIQUE: Gray scale, color Doppler and spectral waveform analysis of the 
hepatic vasculature.





IMPRESSION:



                                        1. PORTAL VEINS:  

*  Main portal vein: The main portal vein is patent. Portal vein velocity is 32 
cm/sec.  Normal flow direction.

*  Left Portal Vein: The left portal vein is patent.

*  Right Portal Vein:The right portal vein is patent.



                                        2. HEPATIC VEINS: 

*  Right Hepatic Vein: The right hepatic vein is patent.

*  Middle Hepatic Vein: The middle hepatic vein is patent.

*  Left Hepatic Vein: The left hepatic vein is patent.



                                        3. HEPATIC ARTERIES: 

*  Right Hepatic Artery: The right hepatic artery is patent.

*  Left Hepatic Artery: The left hepatic artery is patent.



                                        4. IVC: The inferior vena cava is patent.



                                        5. SMV: The superior mesenteric vein is patent.



                                        6. SPLENIC ARTERY/VEINS:  

*  Splenic Artery/Vein at Spleen: The splenic artery/vein at  the spleen are 
patent. Venous velocity 45 cm/s.

*  Splenic Artery/Vein at Midline: The splenic artery/vein at the midline are 
patent. Venous velocity 26 cm/s.











   



                 Performing Organization     Address         City/State/Zipcode     Phone Number

 

   



                     [facility]          [address] [place] [address] 





* US Liver with Elastography (10/17/2018 10:29 AM CDT)









                                         Specimen

 











 



                           Narrative                 Performed At

 

 



                           Abdominal ultrasound with liver elastography, 10/17/2018     [facility]



                                         Clinical history: Fatty liver. Hepatitis C. Fibrosis. 



                                         Comparison: None 



                                         Technique: Grayscale and color Doppler ultrasound abdomen with elastography. 



                                         Findings: 



                                         Inferior vena cava and abdominal aorta: Imaged segments of the IVC and aorta are

 



                                         of normal caliber. 



                                         Pancreas: Imaged segments are normal. 



                                         Liver: Right liver span: 17.2 cm. Increased parenchymal echogenicity with a 



                                         nodular liver margin. Portal vein diameter: 1.3 cm; normal flow direction. 



                                         2D-Shear wave elastography was performed; 10 measurements were obtained from the

 



                                         right hepatic lobe per protocol. The median shear wave velocity was 1.43 m/s, 



                                         most compatible with F>0 fibrosis (see reference ranges for GE Logiq E9 with 



                                         C1-6 Mhz probe 



                                         below) 



                                         GE Computeiq E9 reference ranges: 



                                         METAVIRSW velocity Stiffness 



                                         F>01.35 m/s5.48 kPa 



                                         F>11.66 m/s8.29 kPa 



                                         F>21.77 m/s9.40 kPa 



                                         F>31.99 m/s11.9 kPa 



                                         Reliability: 



                                         The IQR was 0.63, resulting in an IQR/median ratio of 0.254. 



                                         (A value of < 0.3 indicates a reliable dataset). 



                                         Gallbladder: Normal. Wall thickness 0.2 cm. Common bile duct diameter: 0.4 cm. 





                                         Right kidney: Span: 11.4 cm. Normal kidney. 



                                         Left kidney: Span: 11.4 cm. Normal kidney. 



                                         Spleen: Span: 14 cm. 



                                         Impression: 



                                         Hepatomegaly with a nodular contour and increased echogenicity. 



                                         Elastography consistent with F>0 fibrosis. 



                                         Splenomegaly. 









                                        Procedure Note

 

                                        



Hm Interface, Radiology Results Incoming - 10/17/2018  2:02 PM CDT



Abdominal ultrasound with liver elastography, 10/17/2018



Clinical history: Fatty liver. Hepatitis C. Fibrosis.

Comparison: None

Technique: Grayscale and color Doppler ultrasound abdomen with elastography.



Findings:

Inferior vena cava and abdominal aorta: Imaged segments of the IVC and aorta are
of normal caliber.



Pancreas: Imaged segments are normal.



Liver: Right liver span: 17.2 cm. Increased parenchymal echogenicity with a 
nodular liver margin. Portal vein diameter: 1.3 cm; normal flow direction.



                                        2D-Shear wave elastography was performed; 10 measurements were obtained from the

 right hepatic lobe per protocol. The median shear wave velocity was 1.43 m/s, 
most compatible with F>0 fibrosis (see reference ranges for GE Logiq E9 with C1-
6 Mhz probe 

below)



GE Aircrm E9 reference ranges:



METAVIR  SW velocity   Stiffness

F>0  1.35 m/s  5.48 kPa

F>1  1.66 m/s  8.29 kPa

F>2  1.77 m/s  9.40 kPa

F>3  1.99 m/s  11.9 kPa





Reliability:

The IQR was 0.63, resulting in an IQR/median ratio of 0.254.

                                        (A value of < 0.3 indicates a reliable dataset).



Gallbladder: Normal. Wall thickness 0.2 cm. Common bile duct diameter: 0.4 cm.



Right kidney: Span: 11.4 cm. Normal kidney.

Left kidney: Span: 11.4 cm. Normal kidney.



Spleen: Span: 14 cm.



Impression:

Hepatomegaly with a nodular contour and increased echogenicity.

Elastography consistent with F>0 fibrosis.

Splenomegaly.











   



                 Performing Organization     Address         City/State/Zipcode     Phone Number

 

   



                      [facility]          [address] [place] [address] 





* Hepatitis C viral RNA, quantitative real-time PCR with reflexes (2018 
  11:37 AM CDT)





    



              Component     Value        Ref Range     Performed At     Pathologist



                                         Signature

 

    



                 Hepatitis C     <15 NOT DETECTED     IU/mL           [facility] 



                           quantitative,             [facility] 



                                         PCR    

 

    



                 HCV RNA,        <1.18 NOT DETECTED     LogIU/mL        [facility] 



                     quantitative        Comment:            [facility] 



                           real time PCR             REFERENCE RANGE:   



                                         NOT   



                                         DETECTEDIU/mL   



                                         NOT   



                                         DETECTEDLog IU/mL   



                                         This test was performed using   



                                         Real-Time Polymerase   



                                         Chain Reaction   



                                         Reportable range is 15 to   



                                         100,000,000 IU/mL   



                                         (1.18-8.00 Log IU/mL).   



                                         The analytical performance   



                                         characteristics of this   



                                         assay have been determined by   



                                         [facility]   



                                         [facility]. The   



                                         modifications have not been   



                                         cleared or approved by the   



                                         FDA. This assay has been   



                                         validated pursuant to the CLIA   



                                         regulations and is used   



                                         for clinical purposes.   



                                         For additional information,   



                                         please refer to   



                                         http://education.[employer].com/faq/CFN29k5   



                                         (This link is being provided   



                                         for informational/   



                                         educational purposes only.)   













                                         Specimen

 











                                        Resulting Agency Comment

 

                                        



Performing Organization Information:



Site ID: [id number]



Name: [facility]



Address: [address] [place] [id number]



Director: Adam Gloria MD









   



                 Performing Organization     Address         City/[facility]/Zipcode     Phone Number

 

   



                                         [facility]   

 

   



                 [facility]     [address] [place] [address]     [phone] 





after 07/15/2018



Insurance







                                        Type



            Payer      Benefit     Subscriber ID     Effective     Phone      Address 



                           Plan /                    Dates   



                                         Group     

 

                                        PPO



                 BCBS            BCBS            xxxxxxxxxxxx     2017-   



                           CHOICE                    Present   



                                         PPO/[employer] PPO     









     



            Guarantor Name     Account     Relation to     Date of     Phone      Billing Address



                     Type                Patient             Birth  

 

     



            Kedar Isbell     Personal/F     Self       1960     [phone] [address]               ([place])              [place] [address]







Advance Directives





Patient has advance care planning documents on file. For more information, jes alegria contact:



Ganga Conde



[address] [place] [address]

## 2019-08-06 ENCOUNTER — HOSPITAL ENCOUNTER (OUTPATIENT)
Dept: HOSPITAL 88 - OR | Age: 59
Discharge: HOME | End: 2019-08-06
Attending: OTOLARYNGOLOGY
Payer: COMMERCIAL

## 2019-08-06 VITALS — DIASTOLIC BLOOD PRESSURE: 80 MMHG | SYSTOLIC BLOOD PRESSURE: 128 MMHG

## 2019-08-06 DIAGNOSIS — C96.9: ICD-10-CM

## 2019-08-06 DIAGNOSIS — F17.210: ICD-10-CM

## 2019-08-06 DIAGNOSIS — Z86.19: ICD-10-CM

## 2019-08-06 DIAGNOSIS — C09.9: Primary | ICD-10-CM

## 2019-08-06 DIAGNOSIS — Z88.0: ICD-10-CM

## 2019-08-06 DIAGNOSIS — Z88.1: ICD-10-CM

## 2019-08-06 DIAGNOSIS — A63.0: ICD-10-CM

## 2019-08-06 PROCEDURE — 88305 TISSUE EXAM BY PATHOLOGIST: CPT

## 2019-08-06 PROCEDURE — 88331 PATH CONSLTJ SURG 1 BLK 1SPC: CPT

## 2019-08-06 PROCEDURE — 42826 REMOVAL OF TONSILS: CPT

## 2019-08-06 PROCEDURE — 88307 TISSUE EXAM BY PATHOLOGIST: CPT

## 2019-08-06 NOTE — PRE OP HISTORY & PHYSICAL
CHIEF COMPLAINT:  Left neck mass and left tonsillar mass.

 

HISTORY OF PRESENT ILLNESS:  This 58-year-old male was noted to have left neck left neck

mass for about a year.  The patient's lesion has been increasing in size.  CT scan of

the neck showed that the patient has left neck mass.  No comment was noted at the time

of any primary site.  The patient underwent a left neck mass biopsy along with

panendoscopy.  The panendoscopy did not give primary lesion.  The left posterior

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  Subsequently a PET scan was

performed, which showed the left neck mass still present and with increased left

tonsillar uptake. 

 

REVIEW OF SYSTEMS:

System review showed no recent cardiovascular, respiratory, or GI problem.

 

PAST MEDICAL HISTORY:  The patient has a history of hepatitis C and "leaky valve".

 

PAST SURGICAL HISTORY:  The patient had recent left posterior triangle biopsy with

panendoscopy. 

 

ALLERGIES:  ALLERGIC TO PENICILLIN, AMPICILLIN, AND TETRACYCLINE.

 

MEDICATIONS:  He is on hydrocodone, Flexeril, and ibuprofen.

 

SOCIAL HISTORY:  The patient smokes about 1-1/2 pack a day.  Drink a few drinks per day.

 

FAMILY HISTORY:  Noncontributory.

 

PHYSICAL EXAMINATION:

VITAL SIGNS:  On examination, the patient's vital signs were within normal limits. 

HEENT:  Ear exam show normal tympanic membranes bilaterally.  Nasal exam show no obvious

abnormality.  Oropharynx and oral cavity show 2+ tonsils on the right and 3+ on the

left. 

NECK:  Showed left posterior triangle mass. 

CHEST:  Showed good air entry bilaterally. 

CARDIOVASCULAR:  Showed S1, S2.  No murmur noted.

ASSESSMENT AND PLAN:  Mr. Isbell has left posterior triangle mass with likely left

tonsillar area being the primary site.  Suggested treatment is left tonsillectomy and

other necessary procedure.  The complication of procedure includes, but not limited to

bleeding, infection, hyponasal speech, nasal regurgitation of food, airway distress,

persistent recurrence of the problem.  Alternatives will be continue observation, biopsy

of the tonsillar area in the office setting.  The patient and his wife have elected to

undergo surgical procedure. 

 

 

 

 

______________________________

MD SENTHIL Ocampo/JENA

D:  08/05/2019 09:34:34

T:  08/05/2019 10:19:26

Job #:  [id number]

## 2019-08-06 NOTE — XMS REPORT
Clinical Summary

                             Created on: 2019



Kedar Isbell

External Reference #: [id number]

: 1960

Sex: Male



Demographics







                          Address                   [place] [place]  [address]

 

                          Home Phone                [phone]

 

                          Preferred Language        English

 

                          Marital Status            

 

                          Episcopal Affiliation     Unknown

 

                          Race                      White

 

                          Ethnic Group              Non-





Author







                          Author                    [facility]

 

                          Organization              [facility]

 

                          Address                   Unknown

 

                          Phone                     Unavailable







Support







                Name            Relationship    Address         Phone

 

                Starla Isbell    [employer]            Unknown         [phone]







Care Team Providers







                    Care Team Member Name    Role                Phone

 

                    Rosa Elena Haley MD    PCP                 [phone]







Allergies







                                        Comments



                 Active Allergy     Reactions       Severity        Noted Date 

 

                                         



                           Ampicillin                2018 

 

                                         



                           Penicillin                2018 

 

                                         



                           Tetracycline              2018 







Medications







                          End Date                  Status



              Medication     Sig          Dispensed     Refills      Start  



                                         Date  

 

                                                    Active



                 cyclobenzaprine     Take 5 mg by      1                 



                     (FLEXERIL) 5 mg tablet     mouth 2 (two)       8  



                                         times a day.     

 

                                                    Active



                     HYDROcodone-acetaminophen       0                     



                           (NORCO)  mg per        8  



                                         tablet      

 

                                                    Active



                     gabapentin (NEURONTIN)     Take 100 mg         0   



                           100 mg capsule            by mouth 2     



                                         (two) times a     



                                         day.     

 

                                                    Active



                     sofosbuvir-velpatasvir     Take 1 tablet       0   



                           (EPCLUSA) 400-100 mg      by mouth     



                           tablet                    daily.     

 

                                                    Active



                     ribavirin (REBETOL) 200     Take 200 mg         0   



                           MG capsule                by mouth     



                                         daily before     



                                         breakfast.     

 

                                                    Active



                     ribavirin (REBETOL) 200     Take 400 mg         0   



                           MG capsule                by mouth     



                                         every     



                                         evening.     

 

                                                    Active



              diclofenac (VOLTAREN) 1 %     Apply        100 g        1              



                     gel                 topically 4         8  



                                         (four) times     



                                         a day.     







Active Problems







 



                           Problem                   Noted Date

 

 



                           Neck pain, musculoskeletal     2018

 

 



                           Hepatic fibrosis          2018

 

 



                           Abnormal LFTs             2018

 

 



                           Rash of hands             2018

 

 



                           Hepatitis C virus infection without hepatic coma     2018

 

 



                           Itching                   2018







Encounters







                          Care Team                 Description



                     Date                Type                Specialty  

 

                                        



Abdirahman Asher MD                   Malignant neoplasm of head, face, and neck (HCC)



                     2019          Hospital            Radiology  



                                         Encounter   

 

                                        



Abdirahman Asher MD                   Malignant neoplasm of head, face, and neck (HCC) (Primary 

Dx)



                     2019          Transcribe          Access  



                                         Orders   

 

                                        



Ester Ontiveros MA                        



                     2019          Telephone           Hepatology  

 

                                        



Ester Ontiveros MA                       Hepatitis C virus infection without hepatic coma, unspecified 

chronicity (Primary Dx)



                     2019          Orders Only         Hepatology  

 

                                        



Bernabe Harris MD [facility]              Chronic hepatitis C without hepatic coma (HCC) (Primary

 Dx); 

Hepatic fibrosis; 

Abnormal LFTs



                     10/25/2018          Office Visit        Hepatology  

 

                                        



Bernabe Harris MD                     



                     10/18/2018          Telephone           Hepatology  

 

                                        



Venkatesh Appiah MD                    Acute hepatitis C virus infection without hepatic coma; 

Hepatic fibrosis; 

Abnormal LFTs; 

Rash of hands



                     10/17/2018          Hospital            Radiology  



                                         Encounter   

 

                                        



Venkatesh Appiah MD                    Acute hepatitis C virus infection without hepatic coma; 

Hepatic fibrosis; 

Abnormal LFTs; 

Rash of hands



                     10/17/2018          Hospital            Radiology  



                                         Encounter   

 

                                        



Ester Ontiveros MA                       Chronic hepatitis C without hepatic coma (HCC) (Primary Dx)



                     10/05/2018          Orders Only         Hepatology  

 

                                        



Jessica Sheldon MA                        



                     10/02/2018          Refill              Hepatology  

 

                                        



Bernabe Harris MD Calaris, Deborah, PAC                   Acute hepatitis C virus infection without hepatic coma (Primary

 Dx); 

Hepatic fibrosis; 

Abnormal LFTs; 

Rash of hands



                     2018          Office Visit        Hepatology  

 

                                        



Bernabe Harris MD Calaris, Deborah, PAC                   Hepatitis C virus infection without hepatic coma, unspecified

 chronicity (Primary Dx); 

Rash of hands; 

Itching; 

Abnormal LFTs; 

Neck pain, musculoskeletal; 

Hepatic fibrosis



                     2018          Office Visit        Hepatology  



after 2018



Family History







   



                 Medical History     Relation        Name            Comments

 

   



                           Heart attack              Father  

 

   



                           Diabetes                  Mother  

 

   



                           Heart attack              Mother  

 

   



                           Diabetes                  Sister  

 

   



                           Heart attack              Sister  









   



                 Relation        Name            Status          Comments

 

   



                                         Father   

 

   



                                         Mother   

 

   



                                         Sister   







Social History







                                        Date



                 Tobacco Use     Types           Packs/Day       Years Used 

 

                                         



                           Current Every Day Smoker      2  

 

    



                                         Smokeless Tobacco: Never   



                                         Used   









   



                 Alcohol Use     Drinks/Week     oz/Week         Comments

 

   



                     Yes                 14 Standard         8.4 



                                         drinks or  



                                         equivalent  









 



                           Sex Assigned at Birth     Date Recorded

 

 



                                         Not on file 









                                        Industry



                           Job Start Date            Occupation 

 

                                        Not on file



                           Not on file               Not on file 









                                        Travel End



                           Travel History            Travel Start 

 





                                         No recent travel history available.







Last Filed Vital Signs







                                        Time Taken



                           Vital Sign                Reading 

 

                                        10/25/2018 11:19 AM CDT



                           Blood Pressure            136/80 

 

                                        10/25/2018 11:19 AM CDT



                           Pulse                     80 

 

                                        10/25/2018 11:19 AM CDT



                           Temperature               36.1 C (97 F) 

 

                                        2018  2:18 PM CDT



                           Respiratory Rate          18 

 

                                        10/25/2018 11:19 AM CDT



                           Oxygen Saturation         99% 

 

                                        -



                           Inhaled Oxygen            - 



                                         Concentration  

 

                                        10/25/2018 11:19 AM CDT



                           Weight                    81.2 kg (179 lb) 

 

                                        2018 10:56 AM CDT



                           Height                    175.3 cm (5' 9") 

 

                                        2018 10:56 AM CDT



                           Body Mass Index           26.43 







Plan of Treatment







   



                 Health Maintenance     Due Date        Last Done       Comments

 

   



                           COLONOSCOPY SCREENING     2011  

 

   



                           SHINGLES VACCINES (#1)     2011  

 

   



                           INFLUENZA VACCINE         2019  







Procedures







                                        Comments



                 Procedure Name     Priority        Date/Time       Associated Diagnosis 

 

                                        



Results for this procedure are in the results section.



                 PET CT SKULL BASE TO MID     Routine         2019      Malignant neoplasm of 



                     THIGH               10:32 AM CDT        head, face, and neck 



                                         (HCC) 

 

                                        



Results for this procedure are in the results section.



                     POC GLUCOSE         Routine             2019  



                                         8:43 AM CDT  

 

                                        



Results for this procedure are in the results section.



                 PROTHROMBIN TIME WITH INR     Routine         01/15/2019      Chronic hepatitis C 



                           2:13 PM CST               without hepatic coma 



                                         (HCC) 

 

                                        



Results for this procedure are in the results section.



                 HCV QUANTITATIVE PCR     Routine         01/15/2019      Chronic hepatitis C 



                           2:13 PM CST               without hepatic coma 



                                         (HCC) 

 

                                        



Results for this procedure are in the results section.



                 GGT             Routine         01/15/2019      Chronic hepatitis C 



                           2:13 PM CST               without hepatic coma 



                                         (HCC) 

 

                                        



Results for this procedure are in the results section.



                 COMPREHENSIVE METABOLIC     Routine         01/15/2019      Chronic hepatitis C 



                     PANEL               2:13 PM CST         without hepatic coma 



                                         (HCC) 

 

                                        



Results for this procedure are in the results section.



                 CBC WITH PLATELET AND     Routine         01/15/2019      Chronic hepatitis C 



                     DIFFERENTIAL        2:13 PM CST         without hepatic coma 



                                         (HCC) 

 

                                        



Results for this procedure are in the results section.



                 ALPHA FETOPROTEIN     Routine         01/15/2019      Chronic hepatitis C 



                           2:13 PM CST               without hepatic coma 



                                         (HCC) 

 

                                        



Results for this procedure are in the results section.



                 PROTHROMBIN TIME WITH INR     Routine         10/17/2018      Chronic hepatitis C 



                           12:35 PM CDT              without hepatic coma 



                                         (HCC) 

 

                                        



Results for this procedure are in the results section.



                 HCV QUANTITATIVE PCR     Routine         10/17/2018      Chronic hepatitis C 



                           12:35 PM CDT              without hepatic coma 



                                         (HCC) 

 

                                        



Results for this procedure are in the results section.



                 ALPHA FETOPROTEIN     Routine         10/17/2018      Chronic hepatitis C 



                           12:35 PM CDT              without hepatic coma 



                                         (HCC) 

 

                                        



Results for this procedure are in the results section.



                 COMPREHENSIVE METABOLIC     Routine         10/17/2018      Chronic hepatitis C 



                     PANEL               12:35 PM CDT        without hepatic coma 



                                         (HCC) 

 

                                        



Results for this procedure are in the results section.



                 CBC WITH PLATELET AND     Routine         10/17/2018      Chronic hepatitis C 



                     DIFFERENTIAL        12:35 PM CDT        without hepatic coma 



                                         (HCC) 

 

                                        



Results for this procedure are in the results section.



                 US ABDOMINAL DOPPLER     Routine         10/17/2018      Acute hepatitis C virus 



                           11:24 AM CDT              infection without hepatic 



                                         coma 



                                         Hepatic fibrosis 



                                         Abnormal LFTs 



                                         Rash of hands 

 

                                        



Results for this procedure are in the results section.



                 US ABDOMINAL WITH LIVER     Routine         10/17/2018      Acute hepatitis C virus 



                     ELASTOGRAPHY        10:29 AM CDT        infection without hepatic 



                                         coma 



                                         Hepatic fibrosis 



                                         Abnormal LFTs 



                                         Rash of hands 

 

                                        



Results for this procedure are in the results section.



                 HEPATITIS C VIRAL RNA,     Routine         2018      Acute hepatitis C virus 



                     QUANTITATIVE REAL-TIME      11:37 AM CDT        infection without hepatic 



                           PCR WITH REFLXS           coma 



                                         Hepatic fibrosis 



                                         Abnormal LFTs 



                                         Rash of hands 

 

                                        



Results for this procedure are in the results section.



                 PROTHROMBIN TIME WITH INR     Routine         2018      Acute hepatitis C virus 



                           11:37 AM CDT              infection without hepatic 



                                         coma 



                                         Hepatic fibrosis 



                                         Abnormal LFTs 



                                         Rash of hands 

 

                                        



Results for this procedure are in the results section.



                 COMPREHENSIVE METABOLIC     Routine         2018      Acute hepatitis C virus 



                     PANEL               11:37 AM CDT        infection without hepatic 



                                         coma 



                                         Hepatic fibrosis 



                                         Abnormal LFTs 



                                         Rash of hands 

 

                                        



Results for this procedure are in the results section.



                 CBC WITH PLATELET AND     Routine         2018      Acute hepatitis C virus 



                     DIFFERENTIAL        11:37 AM CDT        infection without hepatic 



                                         coma 



                                         Hepatic fibrosis 



                                         Abnormal LFTs 



                                         Rash of hands 

 

                                        



Results for this procedure are in the results section.



                 PROTHROMBIN TIME WITH INR     Routine         2018      Hepatitis C virus 



                           11:52 AM CDT              infection without hepatic 



                                         coma, unspecified 



                                         chronicity 



                                         Rash of hands 



                                         Itching 



                                         Abnormal LFTs 

 

                                        



Results for this procedure are in the results section.



                 HCV QUANTITATIVE PCR     Routine         2018      Hepatitis C virus 



                           11:52 AM CDT              infection without hepatic 



                                         coma, unspecified 



                                         chronicity 



                                         Rash of hands 



                                         Itching 



                                         Abnormal LFTs 

 

                                        



Results for this procedure are in the results section.



                 GGT             Routine         2018      Hepatitis C virus 



                           11:52 AM CDT              infection without hepatic 



                                         coma, unspecified 



                                         chronicity 



                                         Rash of hands 



                                         Itching 



                                         Abnormal LFTs 

 

                                        



Results for this procedure are in the results section.



                 COMPREHENSIVE METABOLIC     Routine         2018      Hepatitis C virus 



                     PANEL               11:52 AM CDT        infection without hepatic 



                                         coma, unspecified 



                                         chronicity 



                                         Rash of hands 



                                         Itching 



                                         Abnormal LFTs 

 

                                        



Results for this procedure are in the results section.



                 CBC WITH PLATELET AND     Routine         2018      Hepatitis C virus 



                     DIFFERENTIAL        11:52 AM CDT        infection without hepatic 



                                         coma, unspecified 



                                         chronicity 



                                         Rash of hands 



                                         Itching 



                                         Abnormal LFTs 

 

                                        



Results for this procedure are in the results section.



                 PROTHROMBIN TIME WITH INR     Routine         2018      Hepatitis C virus 



                           3:18 PM CDT               infection without hepatic 



                                         coma, unspecified 



                                         chronicity 



                                         Abnormal LFTs 

 

                                        



Results for this procedure are in the results section.



                 HCV QUANTITATIVE PCR     Routine         2018      Hepatitis C virus 



                           3:18 PM CDT               infection without hepatic 



                                         coma, unspecified 



                                         chronicity 



                                         Abnormal LFTs 

 

                                        



Results for this procedure are in the results section.



                 GGT             Routine         2018      Hepatitis C virus 



                           3:18 PM CDT               infection without hepatic 



                                         coma, unspecified 



                                         chronicity 



                                         Abnormal LFTs 

 

                                        



Results for this procedure are in the results section.



                 COMPREHENSIVE METABOLIC     Routine         2018      Hepatitis C virus 



                     PANEL               3:18 PM CDT         infection without hepatic 



                                         coma, unspecified 



                                         chronicity 



                                         Abnormal LFTs 

 

                                        



Results for this procedure are in the results section.



                 CBC WITH PLATELET AND     Routine         2018      Hepatitis C virus 



                     DIFFERENTIAL        3:18 PM CDT         infection without hepatic 



                                         coma, unspecified 



                                         chronicity 



                                         Abnormal LFTs 



after 2018



Results

* PET/CT Skull Base To Mid Thigh (2019 10:32 AM CDT)









                                         Specimen

 











 



                           Narrative                 Performed At

 

 



                           PROCEDURE:PET CT SKULL BASE TO MID THIGH     [facility]



                                         INDICATION:Initial staging head and neck cancer.Initial treatment 



                                         strategy. 



                                          



                                         TECHNIQUE:Blood glucose measured at the time of injection was 100 mg/dL. The

 



                                         patient was then injected with 14.9 mCi of 18F-FDG, IV.Approximately one 



                                         hour later, PET images were acquired from the skull base to the mid thighs. 



                                         Corresponding, low dose, 



                                         non-contrast CT scanning was performed as part of the attenuation correction 



                                         process.Automated dose exposure control was utilized. 



                                         COMPARISON:No relevant comparison imaging. 



                                         FINDINGS: 



                                         Head and neck:No suspicious brain uptake.Normal uptake in the visualized

 



                                         sinuses, orbits, and nasopharynx.Focal uptake is noted by the left tonsil, 





                                         which appears somewhat enlarged.The maximum SUV is 9.Uptake does not 



                                         appear to cross midline. 



                                         Multiple left level 2 and level 3 neck lymph nodes are present, with increased 





                                         uptake, consistent with metastatic disease.Index left level 2 lymph node 



                                         demonstrates an SUV of 8.5 and measures 2.2 cm x 1.2 cm.Index left level 3 





                                         lymph node demonstrates 



                                         an SUV of 6 and measures 1.3 cm x 0.9 cm.No suspicious right neck lymph 



                                         node uptake. 



                                          



                                         Chest:No abnormal mediastinal, hilar, or axillary lymph node uptake.No 





                                         suspicious pulmonary uptake. 



                                         Abdomen:Normal uptake in the stomach, spleen, pancreas, liver, and adrenal 





                                         glands.No abnormal retroperitoneal or mesenteric lymph node uptake.Bowel

 



                                         uptake is physiologic. 



                                         Pelvis:Physiologic bowel uptake.No abnormal pelvic sidewall or inguinal

 



                                         lymph node uptake. 



                                         Review of the osseous structures demonstrates no suspicious uptake. 



                                         IMPRESSION: 



                                         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 

2 



                                         and 3 neck lymph nodes, described above. 



                                         [facility]-[id number] 









                                        Procedure Note

 

                                        



[facility], Radiology Results  - 2019  4:32 PM CDT



PROCEDURE:  PET CT SKULL BASE TO MID THIGH



INDICATION:  Initial staging head and neck cancer.  Initial treatment strategy.

  

TECHNIQUE:  Blood glucose measured at the time of injection was 100 mg/dL. The 
patient was then injected with 14.9 mCi of 18F-FDG, IV.  Approximately one hour 
later, PET images were acquired from the skull base to the mid thighs. 
Corresponding, low dose,

 non-contrast CT scanning was performed as part of the attenuation correction 
process.  Automated dose exposure control was utilized.



COMPARISON:  No relevant comparison imaging.



FINDINGS:  



Head and neck:  No suspicious brain uptake.  Normal uptake in the visualized 
sinuses, orbits, and nasopharynx.  Focal uptake is noted by the left tonsil, 
which appears somewhat enlarged.  The maximum SUV is 9.  Uptake does not appear 
to cross midline.  

Multiple left level 2 and level 3 neck lymph nodes are present, with increased 
uptake, consistent with metastatic disease.  Index left level 2 lymph node 
demonstrates an SUV of 8.5 and measures 2.2 cm x 1.2 cm.  Index left level 3 
lymph node demonstrates

 an SUV of 6 and measures 1.3 cm x 0.9 cm.  No suspicious right neck lymph node 
uptake.

  

Chest:  No abnormal mediastinal, hilar, or axillary lymph node uptake.  No 
suspicious pulmonary uptake.

 

Abdomen:  Normal uptake in the stomach, spleen, pancreas, liver, and adrenal 
glands.  No abnormal retroperitoneal or mesenteric lymph node uptake.  Bowel 
uptake is physiologic.

 

Pelvis:  Physiologic bowel uptake.  No abnormal pelvic sidewall or inguinal 
lymph node uptake. 

 

Review of the osseous structures demonstrates no suspicious uptake. 

 

IMPRESSION:  

 

                                        1.  Primary left tonsillar malignancy, with metastatic disease to left level 2 and

 3 neck lymph nodes, described above. 

 

[facility]-[id number] 









   



                 Performing Organization     Address         City/State/Zipcode     Phone Number

 

   



                      [facility]          [address] [place] [address] 





* POC glucose (2019  8:43 AM CDT)





    



              Component     Value        Ref Range     Performed At     Pathologist



                                         Signature

 

    



                 POC glucose     100             65 - 100 mg/dL     [facility] 



                           Comment:                  [place] 



                           Meter ID: [id number]      [place] 



                           : [facility] 













                                         Specimen

 











   



                 Performing Organization     Address         City/[facility]/Zipcode     Phone Number

 

   



                     [facility] DEPARTMENT OF     [address] [place] [address] 



                                         PATHOLOGY AND GENOMIC   



                                         MEDICINE   

 

   



                     [facility]     [address] [place] [address] 



                                         HOSPITAL   





* HCV Quantitative PCR (01/15/2019  2:13 PM CST)



Only the most recent of 4 results within the time period is included.





    



              Component     Value        Ref Range     Performed At     Pathologist



                                         Signature

 

    



                 Hepatitis C     <15 NOT DETECTED     NOT DETECTED IU/mL     [facility] 



                           quantitative,             [facility] 



                           PCR                       ING II 

 

    



                 Hepatitis C     <1.18 NOT DETECTED     NOT DETECTED Log     [facility] 



                     quantitative,       IU/mL               [facility] 



                           PCR                       ING II 

 

    



                     (Always             Comment:            [facility] 



                     message)            This test was performed using      [facility] 



                           Real-Time Polymerase Chain      ING II 



                                         Reaction.   



                                         Reportable Range: 15 IU/mL to   



                                         100,000,000 IU/mL   



                                         (1.18 Log IU/mL to 8.00 Log   



                                         IU/mL).   



                                         The analytical performance   



                                         characteristics of this   



                                         assay have been determined by   



                                         [facility].   



                                         The modifications have not   



                                         been cleared or approved by   



                                         the FDA. This assay has been   



                                         validated pursuant to the   



                                         CLIA regulations and is used   



                                         for clinical purposes.   



                                         For more information on this   



                                         test, go to:   



                                         http://education.[employer].com/faq/JDK17w0   



                                         (This link is being provided   



                                         for informational/   



                                         educational purposes only.)   













                                         Specimen

 





                                         Blood









                                        Resulting Agency Comment

 

                                        



Performing Organization Information:



Site ID: [id number]



Name: [facility]



Address: [address] [place] [address]



Director: Dr. Bernabe Tamayo









   



                 Performing Organization     Address         City/[facility]/Zipcode     Phone Number

 

   



                                         [facility]     [address] [facility] [place] [address] [phone]



                                         II   





* Alpha fetoprotein (01/15/2019  2:13 PM CST)



Only the most recent of 2 results within the time period is included.





    



              Component     Value        Ref Range     Performed At     Pathologist



                                         Signature

 

    



                 Alpha           2.3             <6.1 ng/mL      [facility] 



                     fetoprotein         Comment:            [facility] 



                           This test was performed using      ING II 



                                         the Kareem Sen   



                                         chemiluminescent method.   



                                         Values obtained from   



                                         different assay methods cannot   



                                         be used   



                                         interchangeably. AFP levels,   



                                         regardless of   



                                         value, should not be   



                                         interpreted as absolute   



                                         evidence of the presence or   



                                         absence of disease.   













                                         Specimen

 





                                         Blood









                                        Resulting Agency Comment

 

                                        



Performing Organization Information:



Site ID: [id number]



Name: [facility]



Address: [address] [place] [address]



Director: Dr. Bernabe Tamayo









   



                 Performing Organization     Address         City/[facility]     Phone Number

 

   



                                         [facility]   

 

   



                 [place] [place] [id number] [place] [address] [phone]



                                         II   





* Prothrombin time with INR (01/15/2019  2:13 PM CST)



Only the most recent of 5 results within the time period is included.





    



              Component     Value        Ref Range     Performed At     Pathologist



                                         Signature

 

    



                     INR                 1.1                 [facility] 



                           Comment:                  [facility] 



                           Reference                 ING II 



                                         Range   



                                          0.9-1.1   



                                         Moderate-intensity Warfarin   



                                         Therapy 2.0-3.0   



                                         Higher-intensity Warfarin   



                                         Therapy 3.0-4.0   

 

    



                 Prothrombin     11.4            9.0 - 11.5 sec     [facility] 



                     time                Comment:            [facility] 



                           For more information on this      ING II 



                                         test, go to:   



                                         http://education.[employer]/faq/LLG893   













                                         Specimen

 





                                         Blood









                                        Resulting Agency Comment

 

                                        



Performing Organization Information:



Site ID: [id number]



Name: [facility]



Address: [address] [place] [address]



Director: Dr. Bernabe Tamayo









   



                 Performing Organization     Address         City/[facility]     Phone Number

 

   



                                         [facility]     [address] [place] [address] [phone]



                                         II   





* CBC with platelet and differential (01/15/2019  2:13 PM CST)



Only the most recent of 5 results within the time period is included.





    



              Component     Value        Ref Range     Performed At     Pathologist



                                         Signature

 

    



                 WBC             7.4             3.8 - 10.8      [facility] 



                           Thousand/uL               [facility] 



                                         ING II 

 

    



                 RBC             5.79            4.20 - 5.80     [facility] 



                           Million/uL                [facility] 



                                         ING II 

 

    



                 HGB             17.0            13.2 - 17.1 g/dL     [facility] 



                                         [facility] 



                                         ING II 

 

    



                 HCT             48.5            38.5 - 50.0 %     [facility] 



                                         [facility] 



                                         ING II 

 

    



                 MCV             83.8            80.0 - 100.0 fL     [facility] 



                                         [facility] 



                                         ING II 

 

    



                 MCH             29.4            27.0 - 33.0 pg     [facility] 



                                         [facility] 



                                         ING II 

 

    



                 MCHC            35.1            32.0 - 36.0 g/dL     [facility] 



                                         [facility] 



                                         ING II 

 

    



                 RDW             15.4 (H)        11.0 - 15.0 %     [facility] 



                                         [facility] 



                                         ING II 

 

    



                 Platelet count     231             140 - 400       [facility] 



                           Thousand/uL               [facility] 



                                         ING II 

 

    



                 MPV             11.6            7.5 - 12.5 fL     [facility] 



                                         [facility] 



                                         ING II 

 

    



                 Neutrophils,     3,004           1,500 - 7,800     [facility] 



                     absolute            cells/uL            [facility] 



                                         ING II 

 

    



                 Lymphocytes,     3,352           850 - 3,900 cells/uL     [facility] 



                           absolute                  [facility] 



                                         ING II 

 

    



                 Monocytes,      829             200 - 950 cells/uL     [facility] 



                           absolute                  [facility] 



                                         ING II 

 

    



                 Eosinophils,     148             15 - 500 cells/uL     [facility] 



                           absolute                  [facility] 



                                         ING II 

 

    



                 Basophils,      67              0 - 200 cells/uL     [facility] 



                           absolute                  [facility] 



                                         ING II 

 

    



                 Neutrophils     40.6            %               [facility] 



                                         [facility] 



                                         ING II 

 

    



                 Lymphocytes     45.3            %               [facility] 



                                         [facility] 



                                         ING II 

 

    



                 Monocytes       11.2            %               [facility] 



                                         [facility] 



                                         ING II 

 

    



                 Eosinophils     2.0             %               [facility] 



                                         [facility] 



                                         ING II 

 

    



                 Basophils + RC     0.9             %               [facility] 



                                         [facility] 



                                         ING II 













                                         Specimen

 





                                         Blood









                                        Resulting Agency Comment

 

                                        



Performing Organization Information:



Site ID: [id number]



Name: [facility]



Address: [address] [place] [address]



Director: Dr. Bernabe Tamayo









   



                 Performing Organization     Address         City/[facility]/Zipcode     Phone Number

 

   



                                         [facility]     [address] [place] [address] [phone]



                                         II   





* GGT (01/15/2019  2:13 PM CST)



Only the most recent of 3 results within the time period is included.





    



              Component     Value        Ref Range     Performed At     Pathologist



                                         Signature

 

    



                 GGT             25              3 - 85 U/L      [facility] 



                                         [facility] 



                                         ING II 













                                         Specimen

 





                                         Blood









                                        Resulting Agency Comment

 

                                        



Performing Organization Information:



Site ID: [id number]



Name: [facility]



Address: [address] [place] [address]



Director: Dr. Bernabe Tamayo









   



                 Performing Organization     Address         City/State/Zipcode     Phone Number

 

   



                                         [facility]     [address] [facility].     [place] [address]     [phone]



                                         II   





* Comprehensive metabolic panel (01/15/2019  2:13 PM CST)



Only the most recent of 5 results within the time period is included.





    



              Component     Value        Ref Range     Performed At     Pathologist



                                         Signature

 

    



                 Glucose         128 (H)         65 - 99 mg/dL     [facility] 



                           Comment:                  [facility] 



                            Fasting      ING II 



                                         reference interval   



                                         For someone without known   



                                         diabetes, a glucose   



                                         value >125 mg/dL indicates   



                                         that they may have   



                                         diabetes and this should be   



                                         confirmed with a   



                                         follow-up test.   

 

    



                 BUN, whole      25              7 - 25 mg/dL     [facility] 



                           blood                     [facility] 



                                         ING II 

 

    



                 Creatinine      0.94            0.70 - 1.33 mg/dL     [facility] 



                           Comment:                  [facility] 



                           For patients >49 years of age,      ING II 



                                         the reference limit   



                                         for Creatinine is   



                                         approximately 13% higher for   



                                         people   



                                         identified as   



                                         -American.   

 

    



                 EGFR Non-Afr.     90              > OR=60         [facility] 



                     American            mL/min/1.73m2       [facility] 



                                         ING II 

 

    



                 EGFR      104             > OR=60         [facility] 



                     American            mL/min/1.73m2       [facility] 



                                         ING II 

 

    



                 BUN/creatinine     NOT APPLICABLE     6 - 22 (calc)     [facility] 



                           ratio                     [facility] 



                                         ING II 

 

    



                 Sodium          138             135 - 146 mmol/L     [facility] 



                                         [facility] 



                                         ING II 

 

    



                 Potassium       4.3             3.5 - 5.3 mmol/L     [facility] 



                                         [facility] 



                                         ING II 

 

    



                 Chloride        103             98 - 110 mmol/L     [facility] 



                                         [facility] 



                                         ING II 

 

    



                 CO2             28              20 - 32 mmol/L     [facility] 



                                         [facility] 



                                         ING II 

 

    



                 Calcium         9.7             8.6 - 10.3 mg/dL     [facility] 



                                         [facility] 



                                         ING II 

 

    



                 Protein         7.9             6.1 - 8.1 g/dL     [facility] 



                                         [facility] 



                                         ING II 

 

    



                 Albumin, S      4.5             3.6 - 5.1 g/dL     [facility] 



                                         [facility] 



                                         ING II 

 

    



                 Globulin, total     3.4             1.9 - 3.7 g/dL     [facility] 



                           (calc)                    [facility] 



                                         ING II 

 

    



                 Albumin/globuli     1.3             1.0 - 2.5 (calc)     [facility] 



                           n ratio                   [facility] 



                                         ING II 

 

    



                 Total bilirubin     0.5             0.2 - 1.2 mg/dL     [facility] 



                                         [facility] 



                                         [facility] II 

 

    



                 Alkaline        58              40 - 115 U/L     [facility] 



                           phosphatase               [facility] 



                                         ING II 

 

    



                 AST             24              10 - 35 U/L     [facility] 



                                         [facility] 



                                         ING II 

 

    



                 ALT             17              9 - 46 U/L      [facility] 



                                         [facility] 



                                         ING II 













                                         Specimen

 





                                         Blood









                                        Resulting Agency Comment

 

                                        



Performing Organization Information:



Site ID: [id number]



Name: [facility]



Address: [address] [place] [address]



Director: Dr. Bernabe Tamayo









   



                 Performing Organization     Address         City/State/Zipcode     Phone Number

 

   



                                         [facility]     [address] [id number] [phone]



                                         II   





* US Abdominal Doppler (10/17/2018 11:24 AM CDT)









                                         Specimen

 











 



                           Narrative                 Performed At

 

 



                           EXAMINATION:US ABDOMINAL DOPPLER      [facility]



                                         CLINICAL HISTORY:B17.10 Acute hepatitis C without hepatic coma, K74.0 



                                         Hepatic fibrosis, Portal HTN, US liver W elastography & abdomen doppler...liver

 



                                         protocol 



                                         COMPARISON:None 



                                         TECHNIQUE: Gray scale, color Doppler and spectral waveform analysis of the 



                                         hepatic vasculature. 



                                         IMPRESSION: 



                                         1. PORTAL VEINS: 



                                         *Main portal vein: The main portal vein is patent. Portal vein velocity is 





                                         32 cm/sec.Normal flow direction. 



                                         *Left Portal Vein: The left portal vein is patent. 



                                         *Right Portal Vein:The right portal vein is patent. 



                                         2. HEPATIC VEINS: 



                                         *Right Hepatic Vein: The right hepatic vein is patent. 



                                         *Middle Hepatic Vein: The middle hepatic vein is patent. 



                                         *Left Hepatic Vein: The left hepatic vein is patent. 



                                         3. HEPATIC ARTERIES: 



                                         *Right Hepatic Artery: The right hepatic artery is patent. 



                                         *Left Hepatic Artery: The left hepatic artery is patent. 



                                         4. IVC: The inferior vena cava is patent. 



                                         5. SMV: The superior mesenteric vein is patent. 



                                         6. SPLENIC ARTERY/VEINS: 



                                         *Splenic Artery/Vein at Spleen: The splenic artery/vein atthe spleen are

 



                                         patent. Venous velocity 45 cm/s. 



                                         *Splenic Artery/Vein at Midline: The splenic artery/vein at the midline are

 



                                         patent. Venous velocity 26 cm/s. 









                                        Procedure Note

 

                                        



 Interface, Radiology Results Incoming - 10/17/2018  2:03 PM CDT



EXAMINATION:  US ABDOMINAL DOPPLER



CLINICAL HISTORY:  B17.10 Acute hepatitis C without hepatic coma, K74.0 Hepatic 
fibrosis, Portal HTN, US liver W elastography & abdomen doppler...liver protocol



COMPARISON:  None



TECHNIQUE: Gray scale, color Doppler and spectral waveform analysis of the 
hepatic vasculature.





IMPRESSION:



                                        1. PORTAL VEINS:  

*  Main portal vein: The main portal vein is patent. Portal vein velocity is 32 
cm/sec.  Normal flow direction.

*  Left Portal Vein: The left portal vein is patent.

*  Right Portal Vein:The right portal vein is patent.



                                        2. HEPATIC VEINS: 

*  Right Hepatic Vein: The right hepatic vein is patent.

*  Middle Hepatic Vein: The middle hepatic vein is patent.

*  Left Hepatic Vein: The left hepatic vein is patent.



                                        3. HEPATIC ARTERIES: 

*  Right Hepatic Artery: The right hepatic artery is patent.

*  Left Hepatic Artery: The left hepatic artery is patent.



                                        4. IVC: The inferior vena cava is patent.



                                        5. SMV: The superior mesenteric vein is patent.



                                        6. SPLENIC ARTERY/VEINS:  

*  Splenic Artery/Vein at Spleen: The splenic artery/vein at  the spleen are 
patent. Venous velocity 45 cm/s.

*  Splenic Artery/Vein at Midline: The splenic artery/vein at the midline are 
patent. Venous velocity 26 cm/s.











   



                 Performing Organization     Address         City/State/Zipcode     Phone Number

 

   



                     [facility]          [address] [place] [address] 





* US Liver with Elastography (10/17/2018 10:29 AM CDT)









                                         Specimen

 











 



                           Narrative                 Performed At

 

 



                           Abdominal ultrasound with liver elastography, 10/17/2018      [facility]



                                         Clinical history: Fatty liver. Hepatitis C. Fibrosis. 



                                         Comparison: None 



                                         Technique: Grayscale and color Doppler ultrasound abdomen with elastography. 



                                         Findings: 



                                         Inferior vena cava and abdominal aorta: Imaged segments of the IVC and aorta are

 



                                         of normal caliber. 



                                         Pancreas: Imaged segments are normal. 



                                         Liver: Right liver span: 17.2 cm. Increased parenchymal echogenicity with a 



                                         nodular liver margin. Portal vein diameter: 1.3 cm; normal flow direction. 



                                         2D-Shear wave elastography was performed; 10 measurements were obtained from the

 



                                         right hepatic lobe per protocol. The median shear wave velocity was 1.43 m/s, 



                                         most compatible with F>0 fibrosis (see reference ranges for GE Logiq E9 with 



                                         C1-6 Mhz probe 



                                         below) 



                                         GE Medpriv[employer]iq E9 reference ranges: 



                                         METAVIRSW velocity Stiffness 



                                         F>01.35 m/s5.48 kPa 



                                         F>11.66 m/s8.29 kPa 



                                         F>21.77 m/s9.40 kPa 



                                         F>31.99 m/s11.9 kPa 



                                         Reliability: 



                                         The IQR was 0.63, resulting in an IQR/median ratio of 0.254. 



                                         (A value of < 0.3 indicates a reliable dataset). 



                                         Gallbladder: Normal. Wall thickness 0.2 cm. Common bile duct diameter: 0.4 cm. 





                                         Right kidney: Span: 11.4 cm. Normal kidney. 



                                         Left kidney: Span: 11.4 cm. Normal kidney. 



                                         Spleen: Span: 14 cm. 



                                         Impression: 



                                         Hepatomegaly with a nodular contour and increased echogenicity. 



                                         Elastography consistent with F>0 fibrosis. 



                                         Splenomegaly. 









                                        Procedure Note

 

                                        



 Interface, Radiology Results Incoming - 10/17/2018  2:02 PM CDT



Abdominal ultrasound with liver elastography, 10/17/2018



Clinical history: Fatty liver. Hepatitis C. Fibrosis.

Comparison: None

Technique: Grayscale and color Doppler ultrasound abdomen with elastography.



Findings:

Inferior vena cava and abdominal aorta: Imaged segments of the IVC and aorta are
of normal caliber.



Pancreas: Imaged segments are normal.



Liver: Right liver span: 17.2 cm. Increased parenchymal echogenicity with a 
nodular liver margin. Portal vein diameter: 1.3 cm; normal flow direction.



                                        2D-Shear wave elastography was performed; 10 measurements were obtained from the

 right hepatic lobe per protocol. The median shear wave velocity was 1.43 m/s, 
most compatible with F>0 fibrosis (see reference ranges for GE Logiq E9 with C1-
6 Mhz probe 

below)



GE MedprivÃ©iq E9 reference ranges:



METAVIR  SW velocity   Stiffness

F>0  1.35 m/s  5.48 kPa

F>1  1.66 m/s  8.29 kPa

F>2  1.77 m/s  9.40 kPa

F>3  1.99 m/s  11.9 kPa





Reliability:

The IQR was 0.63, resulting in an IQR/median ratio of 0.254.

                                        (A value of < 0.3 indicates a reliable dataset).



Gallbladder: Normal. Wall thickness 0.2 cm. Common bile duct diameter: 0.4 cm.



Right kidney: Span: 11.4 cm. Normal kidney.

Left kidney: Span: 11.4 cm. Normal kidney.



Spleen: Span: 14 cm.



Impression:

Hepatomegaly with a nodular contour and increased echogenicity.

Elastography consistent with F>0 fibrosis.

Splenomegaly.











   



                 Performing Organization     Address         City/State/Zipcode     Phone Number

 

   



                      [facility]          [address] [place] [address] 





* Hepatitis C viral RNA, quantitative real-time PCR with reflexes (2018 
  11:37 AM CDT)





    



              Component     Value        Ref Range     Performed At     Pathologist



                                         Signature

 

    



                 Hepatitis C     <15 NOT DETECTED     IU/mL           [facility] 



                           quantitative,             [facility] 



                                         PCR    

 

    



                 HCV RNA,        <1.18 NOT DETECTED     LogIU/mL        [facility] 



                     quantitative        Comment:            [facility] 



                           real time PCR             REFERENCE RANGE:   



                                         NOT   



                                         DETECTEDIU/mL   



                                         NOT   



                                         DETECTEDLog IU/mL   



                                         This test was performed using   



                                         Real-Time Polymerase   



                                         Chain Reaction   



                                         Reportable range is 15 to   



                                         100,000,000 IU/mL   



                                         (1.18-8.00 Log IU/mL).   



                                         The analytical performance   



                                         characteristics of this   



                                         assay have been determined by   



                                         [facility]   



                                         Infectious Disease. The   



                                         modifications have not been   



                                         cleared or approved by the   



                                         FDA. This assay has been   



                                         validated pursuant to the CLIA   



                                         regulations and is used   



                                         for clinical purposes.   



                                         For additional information,   



                                         please refer to   



                                         http://education.[employer].com/faq/ZVS45n1   



                                         (This link is being provided   



                                         for informational/   



                                         educational purposes only.)   













                                         Specimen

 











                                        Resulting Agency Comment

 

                                        



Performing Organization Information:



Site ID: [id number]



Name: [facility]



Address: [address] [place] [id number]



Director: Adam Gloria MD









   



                 Performing Organization     Address         City/State/Zipcode     Phone Number

 

   



                                         [facility]   

 

   



                 [facility]     [address] [place] [address]     [phone] [id number] 





after 2018



Insurance







                                        Type



            Payer      Benefit     Subscriber ID     Effective     Phone      Address 



                           Plan /                    Dates   



                                         Group     

 

                                        HMO/PPO



                 [facility]      xxxxxxxxx       2019-P   



                           THCARE                    resent   



                                         CHOICE/CHO     



                                         ICE +     









     



            Guarantor Name     Account     Relation to     Date of     Phone      Billing Address



                     Type                Patient             Birth  

 

     



            Kedar Isbell     Personal/F     Self       1960     [phone]     [address]               ([place])              [place] [address]







Advance Directives





Patient has advance care planning documents on file. For more information, jes alegria contact:



Ganga Conde



[address] [place] [address]

## 2019-08-06 NOTE — OPERATIVE REPORT
DATE OF PROCEDURE:  08/06/2019

 

SURGEON:  Abdirahman Asher MD

 

PREOPERATIVE DIAGNOSIS:  Left tonsil mass, rule out carcinoma of the left tonsil.

 

POSTOPERATIVE DIAGNOSIS:  Left tonsil mass, rule out carcinoma of the left tonsil.

 

OPERATIVE PROCEDURE:  Left tonsillectomy.

 

ANESTHESIA:  Dr. Morris.

 

INDICATIONS:  This 58 years old male has a one year history of left posterior triangle

mass.  The patient has undergone a panendoscopy and left neck node biopsy.  Left neck

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  The tongue base biopsy and

panendoscopy did not give positive primary site.  PET scan was done after the neck

biopsy showed that a likely lesion in the left tonsil.  It was decided left

tonsillectomy to find a primary tumor and other necessary procedure will be beneficial

for him. 

 

DESCRIPTION OF PROCEDURE:  The patient was taken to operating room, put under general

anesthesia, endotracheally intubated.  He was put in Oliva position and McIvor mouth gag

was inserted.  Left tonsil fossas were injected with 0.5% Marcaine with 1:200,000

epinephrine.  The left tonsil was retracted medially.  A plane was created between the

tonsil and tonsillar bed.  Dissection was carried down the inferior pole and tonsil was

snared off.  The right tonsil was examined, no abnormality was noted.  Hemostasis in

tonsillar fossa on the left side was achieved using the suction cautery. 

 

The tonsil on the left side after excision was sent for frozen section.  The frozen

section returned as squamous cell carcinoma. 

 

The oropharynx, oral cavity, and nasopharynx were irrigated with copious amount of

normal saline.  The stomach was suctioned out at the end of the procedure.  The patient

tolerated the above procedure well with estimated blood loss about 30 mL.  He was given

20 mg of Decadron intraoperatively.  The patient was able to be transferred to recovery

room in stable condition. 

 

 

 

 

______________________________

Abdirahmna Asher MD

 

DKH/MODL

D:  08/06/2019 14:11:02

T:  08/06/2019 14:40:06

Job #:  [id number]